# Patient Record
Sex: FEMALE | Race: WHITE | HISPANIC OR LATINO | Employment: UNEMPLOYED | URBAN - METROPOLITAN AREA
[De-identification: names, ages, dates, MRNs, and addresses within clinical notes are randomized per-mention and may not be internally consistent; named-entity substitution may affect disease eponyms.]

---

## 2017-01-20 ENCOUNTER — TELEPHONE (OUTPATIENT)
Dept: INTERNAL MEDICINE | Facility: CLINIC | Age: 27
End: 2017-01-20

## 2017-01-20 NOTE — TELEPHONE ENCOUNTER
Panel Management Review      Patient has the following on her problem list: None      Composite cancer screening  Chart review shows that this patient is due/due soon for the following Pap Smear  Summary:    Patient is due/failing the following:   PAP    Action needed:   No action require- Dr Combs saw pt once for an acute reason. Pt's PCP is Dr Velez.    Type of outreach:    NO action require.    Questions for provider review:    None                                                                                                                                     Urmila Germain CMA       Chart routed to CLOSED .

## 2017-11-12 ENCOUNTER — APPOINTMENT (OUTPATIENT)
Dept: GENERAL RADIOLOGY | Facility: CLINIC | Age: 27
End: 2017-11-12
Attending: EMERGENCY MEDICINE
Payer: COMMERCIAL

## 2017-11-12 ENCOUNTER — HOSPITAL ENCOUNTER (EMERGENCY)
Facility: CLINIC | Age: 27
Discharge: HOME | End: 2017-11-12
Attending: EMERGENCY MEDICINE | Admitting: EMERGENCY MEDICINE
Payer: COMMERCIAL

## 2017-11-12 VITALS
SYSTOLIC BLOOD PRESSURE: 113 MMHG | HEART RATE: 76 BPM | HEIGHT: 65 IN | OXYGEN SATURATION: 99 % | WEIGHT: 170 LBS | DIASTOLIC BLOOD PRESSURE: 76 MMHG | TEMPERATURE: 98.5 F | RESPIRATION RATE: 16 BRPM | BODY MASS INDEX: 28.32 KG/M2

## 2017-11-12 DIAGNOSIS — R07.9 ACUTE CHEST PAIN: ICD-10-CM

## 2017-11-12 LAB
ANION GAP SERPL CALCULATED.3IONS-SCNC: 7 MMOL/L (ref 3–14)
BASOPHILS # BLD AUTO: 0 10E9/L (ref 0–0.2)
BASOPHILS NFR BLD AUTO: 0.4 %
BUN SERPL-MCNC: 17 MG/DL (ref 7–30)
CALCIUM SERPL-MCNC: 7.8 MG/DL (ref 8.5–10.1)
CHLORIDE SERPL-SCNC: 107 MMOL/L (ref 94–109)
CO2 SERPL-SCNC: 26 MMOL/L (ref 20–32)
CREAT SERPL-MCNC: 0.65 MG/DL (ref 0.52–1.04)
DIFFERENTIAL METHOD BLD: NORMAL
EOSINOPHIL # BLD AUTO: 0.1 10E9/L (ref 0–0.7)
EOSINOPHIL NFR BLD AUTO: 2 %
ERYTHROCYTE [DISTWIDTH] IN BLOOD BY AUTOMATED COUNT: 14.5 % (ref 10–15)
GFR SERPL CREATININE-BSD FRML MDRD: >90 ML/MIN/1.7M2
GLUCOSE SERPL-MCNC: 91 MG/DL (ref 70–99)
HCT VFR BLD AUTO: 37.2 % (ref 35–47)
HGB BLD-MCNC: 12.3 G/DL (ref 11.7–15.7)
IMM GRANULOCYTES # BLD: 0 10E9/L (ref 0–0.4)
IMM GRANULOCYTES NFR BLD: 0.2 %
LYMPHOCYTES # BLD AUTO: 1.6 10E9/L (ref 0.8–5.3)
LYMPHOCYTES NFR BLD AUTO: 28.7 %
MCH RBC QN AUTO: 29.9 PG (ref 26.5–33)
MCHC RBC AUTO-ENTMCNC: 33.1 G/DL (ref 31.5–36.5)
MCV RBC AUTO: 91 FL (ref 78–100)
MONOCYTES # BLD AUTO: 0.4 10E9/L (ref 0–1.3)
MONOCYTES NFR BLD AUTO: 7 %
NEUTROPHILS # BLD AUTO: 3.4 10E9/L (ref 1.6–8.3)
NEUTROPHILS NFR BLD AUTO: 61.7 %
NRBC # BLD AUTO: 0 10*3/UL
NRBC BLD AUTO-RTO: 0 /100
PLATELET # BLD AUTO: 242 10E9/L (ref 150–450)
POTASSIUM SERPL-SCNC: 3.8 MMOL/L (ref 3.4–5.3)
RBC # BLD AUTO: 4.11 10E12/L (ref 3.8–5.2)
SODIUM SERPL-SCNC: 140 MMOL/L (ref 133–144)
TROPONIN I SERPL-MCNC: <0.015 UG/L (ref 0–0.04)
WBC # BLD AUTO: 5.6 10E9/L (ref 4–11)

## 2017-11-12 PROCEDURE — 25000132 ZZH RX MED GY IP 250 OP 250 PS 637: Performed by: EMERGENCY MEDICINE

## 2017-11-12 PROCEDURE — 85025 COMPLETE CBC W/AUTO DIFF WBC: CPT | Performed by: EMERGENCY MEDICINE

## 2017-11-12 PROCEDURE — 93005 ELECTROCARDIOGRAM TRACING: CPT

## 2017-11-12 PROCEDURE — 84484 ASSAY OF TROPONIN QUANT: CPT | Performed by: EMERGENCY MEDICINE

## 2017-11-12 PROCEDURE — 94640 AIRWAY INHALATION TREATMENT: CPT

## 2017-11-12 PROCEDURE — 99285 EMERGENCY DEPT VISIT HI MDM: CPT | Mod: 25

## 2017-11-12 PROCEDURE — 80048 BASIC METABOLIC PNL TOTAL CA: CPT | Performed by: EMERGENCY MEDICINE

## 2017-11-12 PROCEDURE — 71020 XR CHEST 2 VW: CPT

## 2017-11-12 PROCEDURE — 25000125 ZZHC RX 250: Performed by: EMERGENCY MEDICINE

## 2017-11-12 RX ORDER — IPRATROPIUM BROMIDE AND ALBUTEROL SULFATE 2.5; .5 MG/3ML; MG/3ML
SOLUTION RESPIRATORY (INHALATION)
Status: DISCONTINUED
Start: 2017-11-12 | End: 2017-11-13 | Stop reason: HOSPADM

## 2017-11-12 RX ORDER — IPRATROPIUM BROMIDE AND ALBUTEROL SULFATE 2.5; .5 MG/3ML; MG/3ML
3 SOLUTION RESPIRATORY (INHALATION) ONCE
Status: COMPLETED | OUTPATIENT
Start: 2017-11-12 | End: 2017-11-12

## 2017-11-12 RX ORDER — ACETAMINOPHEN 325 MG/1
975 TABLET ORAL ONCE
Status: COMPLETED | OUTPATIENT
Start: 2017-11-12 | End: 2017-11-12

## 2017-11-12 RX ADMIN — ACETAMINOPHEN 975 MG: 325 TABLET, FILM COATED ORAL at 22:01

## 2017-11-12 RX ADMIN — IPRATROPIUM BROMIDE AND ALBUTEROL SULFATE 3 ML: .5; 3 SOLUTION RESPIRATORY (INHALATION) at 21:39

## 2017-11-12 ASSESSMENT — ENCOUNTER SYMPTOMS
COUGH: 1
FEVER: 0
CHEST TIGHTNESS: 1
HEADACHES: 1

## 2017-11-12 NOTE — ED AVS SNAPSHOT
[facility] Emergency Department    [address]    [facility] [address]    Phone:  [phone]    Fax:  [phone]                                       Stacey Phillips   MRN: [id number]    Department:  [facility] Emergency Department   Date of Visit:  11/12/2017           After Visit Summary Signature Page     I have received my discharge instructions, and my questions have been answered. I have discussed any challenges I see with this plan with the nurse or doctor.    ..........................................................................................................................................  Patient/Patient Representative Signature      ..........................................................................................................................................  Patient Representative Print Name and Relationship to Patient    ..................................................               ................................................  Date                                            Time    ..........................................................................................................................................  Reviewed by Signature/Title    ...................................................              ..............................................  Date                                                            Time

## 2017-11-12 NOTE — ED AVS SNAPSHOT
[facility] Emergency Department    [address]    [facility] [address]    Phone:  [phone]    Fax:  [phone]                                       Stacey Phillips   MRN: [id number]    Department:  [facility] Emergency Department   Date of Visit:  11/12/2017           Patient Information     Date Of Birth          1990        Your diagnoses for this visit were:     Acute chest pain        You were seen by Nithya Wilkinson MD.      Follow-up Information     Follow up with Clinic, [facility] In 3 days.    Specialty:  Internal Medicine    Contact information:    [address]  [place] [address]  [phone]          Discharge Instructions       Discharge Instructions  Chest Pain    You have been seen today for chest pain or discomfort.  At this time, your doctor has found no signs that your chest pain is due to a serious or life-threatening condition, (or you have declined more testing and/or admission to the hospital). However, sometimes there is a serious problem that does not show up right away. Your evaluation today may not be complete and you may need further testing and evaluation.     You need to follow-up with your regular doctor within 3 days.    Return to the Emergency Department if:    Your chest pain changes, gets worse, starts to happen more often, or comes with less activity.    You are short of breath.    You get very weak or tired.    You pass out or faint.    You have any new symptoms, like fever, cough, numb legs, or you cough up blood.    You have anything else that worries you.    Until you follow-up with your regular doctor please do the following:    Take one aspirin daily unless you have an allergy or are told not to by your doctor.    If a stress test appointment has been made, go to the appointment.    If you have questions, contact your regular doctor.    If your doctor today has told you to follow-up with  your regular doctor, it is very important that you make an appointment with your clinic and go to the appointment.  If you do not follow-up with your primary doctor, it may result in missing an important development which could result in permanent injury or disability and/or lasting pain.  If there is any problem keeping your appointment, call your doctor or return to the Emergency Department.    If you were given a prescription for medicine here today, be sure to read all of the information (including the package insert) that comes with your prescription.  This will include important information about the medicine, its side effects, and any warnings that you need to know about.  The pharmacist who fills the prescription can provide more information and answer questions you may have about the medicine.  If you have questions or concerns that the pharmacist cannot address, please call or return to the Emergency Department.           24 Hour Appointment Hotline       To make an appointment at any [facility], call [phone] ([phone]). If you don't have a family doctor or clinic, we will help you find one. [facility] are conveniently located to serve the needs of you and your family.             Review of your medicines      Our records show that you are taking the medicines listed below. If these are incorrect, please call your family doctor or clinic.        Dose / Directions Last dose taken    ibuprofen 600 MG tablet   Commonly known as:  ADVIL/MOTRIN   Dose:  600 mg   Quantity:  30 tablet        Take 1 tablet (600 mg) by mouth every 6 hours as needed for moderate pain   Refills:  1        TYLENOL PO   Dose:  1000 mg        Take 1,000 mg by mouth   Refills:  0                Procedures and tests performed during your visit     Basic metabolic panel    CBC with platelets differential    Chest XR,  PA & LAT    EKG 12 lead    Troponin I      Orders Needing Specimen Collection     None      Pending  Results     Date and Time Order Name Status Description    11/12/2017 2017 EKG 12 lead Preliminary             Pending Culture Results     No orders found from 11/10/2017 to 11/13/2017.            Pending Results Instructions     If you had any lab results that were not finalized at the time of your Discharge, you can call the ED Lab Result RN at [phone]. You will be contacted by this team for any positive Lab results or changes in treatment. The nurses are available 7 days a week from 10A to 6:30P.  You can leave a message 24 hours per day and they will return your call.        Test Results From Your Hospital Stay        11/12/2017  8:46 PM      Narrative     CHEST TWO VIEWS   11/12/2017 8:43 PM    HISTORY:  Cough.    COMPARISON:  None.    FINDINGS:  The heart size is normal. No mediastinal pathology is seen.  The lungs are clear. The pulmonary vasculature is normal. No  pneumothorax or pleural effusion is seen.         Impression     IMPRESSION:  Unremarkable chest.    LYNN LUZ MD         11/12/2017  9:49 PM      Component Results     Component Value Ref Range & Units Status    WBC 5.6 4.0 - 11.0 10e9/L Final    RBC Count 4.11 3.8 - 5.2 10e12/L Final    Hemoglobin 12.3 11.7 - 15.7 g/dL Final    Hematocrit 37.2 35.0 - 47.0 % Final    MCV 91 78 - 100 fl Final    MCH 29.9 26.5 - 33.0 pg Final    MCHC 33.1 31.5 - 36.5 g/dL Final    RDW 14.5 10.0 - 15.0 % Final    Platelet Count 242 150 - 450 10e9/L Final    Diff Method Automated Method  Final    % Neutrophils 61.7 % Final    % Lymphocytes 28.7 % Final    % Monocytes 7.0 % Final    % Eosinophils 2.0 % Final    % Basophils 0.4 % Final    % Immature Granulocytes 0.2 % Final    Nucleated RBCs 0 0 /100 Final    Absolute Neutrophil 3.4 1.6 - 8.3 10e9/L Final    Absolute Lymphocytes 1.6 0.8 - 5.3 10e9/L Final    Absolute Monocytes 0.4 0.0 - 1.3 10e9/L Final    Absolute Eosinophils 0.1 0.0 - 0.7 10e9/L Final    Absolute Basophils 0.0 0.0 - 0.2 10e9/L Final     Abs Immature Granulocytes 0.0 0 - 0.4 10e9/L Final    Absolute Nucleated RBC 0.0  Final         11/12/2017 10:12 PM      Component Results     Component Value Ref Range & Units Status    Sodium 140 133 - 144 mmol/L Final    Potassium 3.8 3.4 - 5.3 mmol/L Final    Chloride 107 94 - 109 mmol/L Final    Carbon Dioxide 26 20 - 32 mmol/L Final    Anion Gap 7 3 - 14 mmol/L Final    Glucose 91 70 - 99 mg/dL Final    Urea Nitrogen 17 7 - 30 mg/dL Final    Creatinine 0.65 0.52 - 1.04 mg/dL Final    GFR Estimate >90 >60 mL/min/1.7m2 Final    Non  GFR Calc    GFR Estimate If Black >90 >60 mL/min/1.7m2 Final    African American GFR Calc    Calcium 7.8 (L) 8.5 - 10.1 mg/dL Final         11/12/2017 10:12 PM      Component Results     Component Value Ref Range & Units Status    Troponin I ES <0.015 0.000 - 0.045 ug/L Final    The 99th percentile for upper reference range is 0.045 ug/L.  Troponin values   in the range of 0.045 - 0.120 ug/L may be associated with risks of adverse   clinical events.                  Clinical Quality Measure: Blood Pressure Screening     Your blood pressure was checked while you were in the emergency department today. The last reading we obtained was  BP: 117/81 . Please read the guidelines below about what these numbers mean and what you should do about them.  If your systolic blood pressure (the top number) is less than 120 and your diastolic blood pressure (the bottom number) is less than 80, then your blood pressure is normal. There is nothing more that you need to do about it.  If your systolic blood pressure (the top number) is 120-139 or your diastolic blood pressure (the bottom number) is 80-89, your blood pressure may be higher than it should be. You should have your blood pressure rechecked within a year by a primary care provider.  If your systolic blood pressure (the top number) is 140 or greater or your diastolic blood pressure (the bottom number) is 90 or greater, you may  "have high blood pressure. High blood pressure is treatable, but if left untreated over time it can put you at risk for heart attack, stroke, or kidney failure. You should have your blood pressure rechecked by a primary care provider within the next 4 weeks.  If your provider in the emergency department today gave you specific instructions to follow-up with your doctor or provider even sooner than that, you should follow that instruction and not wait for up to 4 weeks for your follow-up visit.        Thank you for choosing [facility]       Thank you for choosing [facility] for your care. Our goal is always to provide you with excellent care. Hearing back from our patients is one way we can continue to improve our services. Please take a few minutes to complete the written survey that you may receive in the mail after you visit with us. Thank you!        [facility] Information     [employer] lets you send messages to your doctor, view your test results, renew your prescriptions, schedule appointments and more. To sign up, go to [email or web address] . Click on \"Log in\" on the left side of the screen, which will take you to the Welcome page. Then click on \"Sign up Now\" on the right side of the page.     You will be asked to enter the access code listed below, as well as some personal information. Please follow the directions to create your username and password.     Your access code is: [email or web address]  Expires: 2/10/2018 10:28 PM     Your access code will  in 90 days. If you need help or a new code, please call your [facility] clinic or [phone].        Care EveryWhere ID     This is your Care EveryWhere ID. This could be used by other organizations to access your [facility] medical records  [id number]        Equal Access to Services     ZEINAB MASON : nilam Moreno qaybta kaalmada adeegyada, waxay idiin hayaan adeeg kharash la'aan ah. So [facility] [phone].    ATENCIÓN: Si dmitry jaime, " tiene a sierra disposición servicios gratuitos de asistencia lingüística. Lllogan al [phone].    We comply with applicable federal civil rights laws and [place] laws. We do not discriminate on the basis of race, color, national origin, age, disability, sex, sexual orientation, or gender identity.            After Visit Summary       This is your record. Keep this with you and show to your community pharmacist(s) and doctor(s) at your next visit.

## 2017-11-13 LAB — INTERPRETATION ECG - MUSE: NORMAL

## 2017-11-13 NOTE — ED NOTES
Pt arrives with cough and chest pain. Ongoing past 24 hrs, pain worse with coughing, A/ox 3, ABC's intact. Took tylenol this am 0800. No med hx or home meds.

## 2017-11-13 NOTE — ED PROVIDER NOTES
History     Chief Complaint:  Chest Pain and Cough    HPI   Stacey Phillips is a 27 year old female with a history of anemia who presents with chest pain and cough. The patient states she has had some chest pressure for the past week or so and then last night she developed some congestion and a cough. Today, her symptoms have worsened, so she came in for evaluation since the chest tightness has been bothering her. The patient notes she has not been coughing up much phlegm at all. She does state she has been having more headaches than normal, which are temporarily alleviated with Tylenol, but return shortly after it wears off. She denies any history of blood clots, recent travel, leg swelling or hormone use. Of note, multiple of the patient's family members have had cardiovascular events including her father's first heart attack in his early 30s.    CARDIAC RISK FACTORS:  Sex:    Female  Tobacco:   No  Hypertension:   No  Hyperlipidemia:  No  Diabetes:   No  Family History:  Yes, father    PE/DVT RISK FACTORS:  Sex:    Female  Hormones:   No  Tobacco:   No  Cancer:   No  Travel:   No  Surgery:   No  Other immobilization: No  Personal history:  No  Family history:  No    Allergies:  Augmentin: hives     Medications:    Tylenol  Ibuprofen    Past Medical History:    Anemia    Past Surgical History:    History reviewed. No pertinent surgical history.    Family History:    Heart disease  Stomach cancer  Eczema    Social History:  Smoking status: No  Alcohol use: Yes, occasionally  PCP: Ghazal Murcia  Marital Status: Single [1]     Review of Systems   Constitutional: Negative for fever.   HENT: Positive for congestion.    Respiratory: Positive for cough and chest tightness.    Cardiovascular: Negative for leg swelling.   Neurological: Positive for headaches.   All other systems reviewed and are negative.    Physical Exam     Patient Vitals for the past 24 hrs:   BP Temp Temp src Pulse Resp SpO2  "Height Weight   11/12/17 2131 117/81 - - - - 100 % - -   11/12/17 2013 140/83 98.5  F (36.9  C) Oral 76 16 98 % 1.651 m (5' 5\") 77.1 kg (170 lb)     Physical Exam  General: Patient is alert and interactive when I enter the room  Head:  The scalp, face, and head appear normal  Eyes:  Conjunctivae are normal  ENT:    The nose is normal    Pinnae are normal    External acoustic canals are normal  Neck:  Trachea midline  CV:  Pulses are normal, RRR  Resp:  No respiratory distress, CTAB  Abdomen:      Soft, non-tender, non-distended  Musc:  Normal muscular tone    No major joint effusions    No asymmetric leg swelling    Good capillary refill noted  Skin:  No rash or lesions noted  Neuro:  Speech is normal and fluent. Face is symmetric.     Moving all extremities well.   Psych: Awake. Alert.  Normal affect.  Appropriate interactions.    Emergency Department Course   ECG (20:18:58):  Rate 75 bpm. ME interval 152. QRS duration 92. QT/QTc 394/439. P-R-T axes 54 64 51. Normal sinus rhythm. Normal ECG. Interpreted at 2022 by Nithya Wilkinson MD.    Imaging:  Radiographic findings were communicated with the patient who voiced understanding of the findings.    Chest XR, PA & LAT:  Unremarkable chest.  As read by Radiology.    Laboratory:  CBC: WNL (WBC 5.6, HGB 12.3, )   BMP: Calcium 7.8 (L), o/w WNL (Creatinine 0.65)  Troponin: <0.015    Interventions:  2139: Duoneb, 3 mL, nebulization   2201: 975 mg Tylenol PO    Emergency Department Course:  Past medical records, nursing notes, and vitals reviewed.  2105: I performed an exam of the patient and obtained history, as documented above.  ECG performed, results above.  IV inserted and blood drawn.    2225: I rechecked the patient. Explained findings to the patient.    I rechecked the patient. Findings and plan explained to the patient. Patient discharged home with instructions regarding supportive care, medications, and reasons to return. The importance of close follow-up " was reviewed.     Impression & Plan      Medical Decision Making:  Stacey Phillips is a 27 year old female was evaluated in the ED for a cough and chest pressure. The patient was afebrile in the ED. The patient's workup did not reveal a bacterial source for infection. I have encouraged symptomatic management with analgesics,. The patient's exam was unremarkable.  She felt better with Tylenol and a Duoneb here in the ED. She does have a family history of cardiac disease so an EKG and labs were obtained. EKG unremarkable. CBC, troponin and BMP normal. CXR normal. No need for D-dimer as patient is PERC negative. Her chest pain as been going over for more then 6 hours so I think a one time troponin is sufficient to rule her out for ACS. I suspect this is either a MSK source of her chest pain versus bronchitis.  At this time the patient is stable for discharge and should follow up with their primary care physician in the outpatient setting. Anticipatory guidance given prior to discharge.    Diagnosis:    ICD-10-CM   1. Acute chest pain R07.9     Disposition: Discharged to home    Shonda Ferreira  11/12/2017   [facility] EMERGENCY DEPARTMENT    IShonda, am serving as a scribe at 9:05 PM on 11/12/2017 to document services personally performed by Nithya Wilkinson MD based on my observations and the provider's statements to me.        Nithya Wilkinson MD  11/13/17 0001

## 2020-02-23 ENCOUNTER — HEALTH MAINTENANCE LETTER (OUTPATIENT)
Age: 30
End: 2020-02-23

## 2020-03-10 ENCOUNTER — HOSPITAL ENCOUNTER (EMERGENCY)
Facility: CLINIC | Age: 30
Discharge: HOME | End: 2020-03-10
Attending: EMERGENCY MEDICINE | Admitting: EMERGENCY MEDICINE
Payer: MEDICAID

## 2020-03-10 ENCOUNTER — RECORDS - HEALTHEAST (OUTPATIENT)
Dept: ADMINISTRATIVE | Facility: OTHER | Age: 30
End: 2020-03-10

## 2020-03-10 VITALS
DIASTOLIC BLOOD PRESSURE: 100 MMHG | HEART RATE: 74 BPM | RESPIRATION RATE: 18 BRPM | SYSTOLIC BLOOD PRESSURE: 142 MMHG | OXYGEN SATURATION: 100 % | TEMPERATURE: 97.4 F

## 2020-03-10 DIAGNOSIS — S06.0X0A CONCUSSION WITHOUT LOSS OF CONSCIOUSNESS, INITIAL ENCOUNTER: Primary | ICD-10-CM

## 2020-03-10 PROCEDURE — 99283 EMERGENCY DEPT VISIT LOW MDM: CPT

## 2020-03-10 RX ORDER — ONDANSETRON 4 MG/1
4 TABLET, ORALLY DISINTEGRATING ORAL EVERY 6 HOURS PRN
Qty: 12 TABLET | Refills: 0 | Status: SHIPPED | OUTPATIENT
Start: 2020-03-10 | End: 2020-03-14

## 2020-03-10 ASSESSMENT — ENCOUNTER SYMPTOMS
NECK PAIN: 0
CONFUSION: 0
VOMITING: 1

## 2020-03-10 NOTE — ED PROVIDER NOTES
"  History     Chief Complaint:  Fall    The history is provided by the patient.      Stacey Phillips is a healthy 29 year old female who presents for evaluation after a fall. She was in [facility] until 4 days ago. While there, she sustained a mechanical fall 2/2/7/20. She hit the back of her head. She did not lose consciousness, but did have blurry vision. Afterwards, she had a bump in this area which she iced and it has improved. She went to a doctor in a pharmacy in [facility] who gave her \"nausea and migraine medication.\" She has since ran out of this medication. She presents due to ongoing intermittent lightheadedness as well as intermittent nausea and vomiting (about 2 times a day). She also has occasional headaches, often at night, that start occipitally then radiate towards her forehead. Overall, her symptoms have improved since onset. She has no current headache, lightheadedness, or nausea. She denies vision changes, confusion, neck pain, or other concerns. She denies respiratory symptoms.    Allergies:  Augmentin     Medications:    The patient is currently on no regular medications.    Past Medical History:    Anemia  Menorrhagia  Allergic rhinitis  Asthma     Past Surgical History:    The patient does not have any pertinent past surgical history.    Family History:    Coronary artery disease - father  Stomach cancer - sister  Diabetes - mother  Paris's disease - mother  Thyroid disease - mother  Eczema - son     Social History:  Negative for tobacco use.  Negative for alcohol use - occasionally.  Negative for drug use.  Marital Status:  Single    Presents alone.    Review of Systems   Constitutional: Negative for fever.   HENT: Negative for rhinorrhea.    Eyes: Negative for visual disturbance (resolved).   Respiratory: Negative for cough.    Gastrointestinal: Positive for nausea (intermittent) and vomiting.   Musculoskeletal: Negative for neck pain.   Neurological: Positive for light-headedness " (intermittent) and headaches (intermittent). Negative for weakness.   Psychiatric/Behavioral: Negative for confusion.   All other systems reviewed and are negative.    Physical Exam     Patient Vitals for the past 24 hrs:   BP Temp Temp src Pulse Resp SpO2   03/10/20 1704 (!) 142/100 97.4  F (36.3  C) Temporal 74 18 100 %      Physical Exam  General: Well-developed and well-nourished. Well appearing young  woman. Cooperative.  Head:  Atraumatic. No palpable hematoma or step offs.   Eyes:  Conjunctivae, lids, and sclerae are normal.  ENT:    Normal nose. Moist mucous membranes. No Bernal's sign or raccoon's eyes. No hemotympanum.  Neck:  Supple. Normal range of motion. No midline tenderness.  CV:  Regular rate and rhythm. Normal heart sounds with no murmurs, rubs, or gallops detected.  Resp:  No respiratory distress. Clear to auscultation bilaterally without decreased breath sounds, wheezing, rales, or rhonchi.  GI:  Non-distended.     MS:  Normal ROM. No external signs of trauma.   Skin:  Warm. Non-diaphoretic. No pallor.  Neuro: Awake. A&Ox3.     Strength 5/5 bilateral upper and lower extremities.    Sensation intact to light touch.    No facial droop. No dysarthria.    No aphasia.    PERRL.   Psych: Normal mood and affect. Normal speech.  Vitals reviewed.    Emergency Department Course   Emergency Department Course:  Nursing notes and vitals reviewed.  1810: I performed an exam of the patient as documented above.   1818: Findings and plan explained to the patient. Patient discharged home with instructions regarding supportive care, medications, and reasons to return. The importance of close follow-up was reviewed. The patient was prescribed Zofran.    I personally answered all related questions prior to discharge.     Impression & Plan    Medical Decision Making:  Stacey is a healthy 29-year-old female who had a fall and head injury 12 days ago.  She did not have loss of consciousness, but did have some  blurred vision afterwards.  Since then she has had intermittent headaches that are overall improving as well is intermittent lightheadedness, nausea, and occasional vomiting.  During interview she does not have any significant symptoms but is presenting because she wonders if there is anything she can take for her symptoms and how long they will last.  Fortunately, her exam is quite reassuring.  She has no external evidence of trauma including no palpable hematoma or evidence of basilar skull fracture.  She has no focal neurologic deficits.  Certainly her symptoms are most consistent with a concussion.  Without any current symptoms in the ER, severe symptoms, loss of consciousness, confusion, or neurologic deficits, CT imaging of the brain can safely be deferred.  I discussed at length with Stacey the treatment for her concussion.  We discussed no return to activities until she is completely asymptomatic in order to avoid reinjury.  She will use Tylenol or ibuprofen when she has her headaches and I will provide her with Zofran as needed for nausea and vomiting.  I also put in a concussion referral and provided her with information for the concussion clinic so she can follow-up and have treatment through them as her symptoms have been present for now 12 days.  Stacey is comfortable with plan for discharge and understands why CT imaging is being deferred.  However, she will return if she has significant worsening of pain, syncope, confusion, weakness, or any other new or concerning symptoms as she may require further investigation is she is worsening.  All of her questions were answered and she verbalized understanding.  Amenable to discharge with concussion clinic follow-up.    Diagnosis:    ICD-10-CM    1. Concussion without loss of consciousness, initial encounter  S06.0X0A CONCUSSION  REFERRAL       Disposition:  discharged home    Discharge Medications:  New Prescriptions    ONDANSETRON (ZOFRAN  ODT) 4 MG ODT TAB    Take 1 tablet (4 mg) by mouth every 6 hours as needed for nausea     Scribe Disclosure:  I, Mirtha Palmer MD, am serving as a scribe at 6:25 PM on 3/10/2020 to document services personally performed by Shelbi No MD based on my observations and the provider's statements to me.    3/10/2020   [facility] EMERGENCY DEPARTMENT       Shelbi No MD  03/11/20 1516

## 2020-03-10 NOTE — ED AVS SNAPSHOT
[facility] Emergency Department  [address]  [facility] [address]  Phone:  [phone]  Fax:  [phone]                                    Stacey Phillips   MRN: [id number]    Department:  [facility] Emergency Department   Date of Visit:  3/10/2020           After Visit Summary Signature Page    I have received my discharge instructions, and my questions have been answered. I have discussed any challenges I see with this plan with the nurse or doctor.    ..........................................................................................................................................  Patient/Patient Representative Signature      ..........................................................................................................................................  Patient Representative Print Name and Relationship to Patient    ..................................................               ................................................  Date                                   Time    ..........................................................................................................................................  Reviewed by Signature/Title    ...................................................              ..............................................  Date                                               Time          22EPIC Rev 08/18
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
## 2020-03-11 ASSESSMENT — ENCOUNTER SYMPTOMS
FEVER: 0
WEAKNESS: 0
COUGH: 0
NAUSEA: 1
HEADACHES: 1
RHINORRHEA: 0
LIGHT-HEADEDNESS: 1

## 2020-03-18 ENCOUNTER — AMBULATORY - HEALTHEAST (OUTPATIENT)
Dept: NEUROLOGY | Facility: CLINIC | Age: 30
End: 2020-03-18

## 2020-03-18 DIAGNOSIS — S06.0XAA CONCUSSION: ICD-10-CM

## 2020-04-02 ENCOUNTER — TELEPHONE (OUTPATIENT)
Dept: PEDIATRICS | Facility: CLINIC | Age: 30
End: 2020-04-02

## 2020-04-02 NOTE — TELEPHONE ENCOUNTER
Call received from patient stating she was seen in the ED 3/10/19 for concussion after fall on 2/27/20. She was referred to the concussion clinic but states she can't get an appointment because they are not taking any new patients. Patient states she still has headaches and lightheadedness daily. States the light headedness occurs with position changes or any sudden movements. Patient is no longer having nausea. Patient calling asking to schedule an appointment. Patient does not have a primary care provider. Was seen by Dr. Combs 3 years ago. Will route to Dr. Combs for direction as writer does not know how to advise patient. Unsure if in clinic visit is appropriate or if virtual visit with either one of our providers or [facility] would be appropriate. Please advise.

## 2020-04-02 NOTE — TELEPHONE ENCOUNTER
She really needs to go see a neurologist rather than the internist, there is not a whole lot that we would be able to do.  I see she was seen at 1 of the [facility] clinics last year, not sure if her insurance changed again so it may be best for her to check with her insurance regarding neurologist she can see.  Otherwise [facility] has a concussion program she might want to check with insurance to see if they would cover her there.   normal...

## 2020-04-16 ENCOUNTER — HOSPITAL ENCOUNTER (OUTPATIENT)
Dept: NEUROLOGY | Facility: CLINIC | Age: 30
Setting detail: THERAPIES SERIES
Discharge: STILL ADMITTED OR ADMINISTRATIVE | End: 2020-04-16
Attending: NURSE PRACTITIONER

## 2020-04-24 ENCOUNTER — HOSPITAL ENCOUNTER (OUTPATIENT)
Dept: NEUROLOGY | Facility: CLINIC | Age: 30
Setting detail: THERAPIES SERIES
Discharge: STILL ADMITTED OR ADMINISTRATIVE | End: 2020-04-24
Attending: NURSE PRACTITIONER

## 2020-04-30 ENCOUNTER — HOSPITAL ENCOUNTER (OUTPATIENT)
Dept: NEUROLOGY | Facility: CLINIC | Age: 30
Setting detail: THERAPIES SERIES
Discharge: STILL ADMITTED OR ADMINISTRATIVE | End: 2020-04-30
Attending: NURSE PRACTITIONER

## 2020-05-28 ENCOUNTER — HOSPITAL ENCOUNTER (OUTPATIENT)
Dept: NEUROLOGY | Facility: CLINIC | Age: 30
Setting detail: THERAPIES SERIES
Discharge: STILL ADMITTED OR ADMINISTRATIVE | End: 2020-05-28
Attending: NURSE PRACTITIONER

## 2020-05-28 DIAGNOSIS — G47.00 INSOMNIA, UNSPECIFIED TYPE: ICD-10-CM

## 2020-05-28 DIAGNOSIS — R68.89 SENSITIVITY TO LIGHT: ICD-10-CM

## 2020-05-28 DIAGNOSIS — R42 DIZZINESS: ICD-10-CM

## 2020-05-28 DIAGNOSIS — R53.83 FATIGUE, UNSPECIFIED TYPE: ICD-10-CM

## 2020-05-28 DIAGNOSIS — G44.309 POST-CONCUSSION HEADACHE: ICD-10-CM

## 2020-05-28 DIAGNOSIS — R41.3 MEMORY DIFFICULTIES: ICD-10-CM

## 2020-05-28 DIAGNOSIS — H83.3X3 SOUND SENSITIVITY IN BOTH EARS: ICD-10-CM

## 2020-05-28 DIAGNOSIS — F06.4 ANXIETY DISORDER DUE TO MEDICAL CONDITION: ICD-10-CM

## 2020-05-28 DIAGNOSIS — R11.0 NAUSEA: ICD-10-CM

## 2020-05-28 DIAGNOSIS — F07.81 POST CONCUSSION SYNDROME: ICD-10-CM

## 2020-06-12 ENCOUNTER — HOSPITAL ENCOUNTER (OUTPATIENT)
Dept: NEUROLOGY | Facility: CLINIC | Age: 30
Setting detail: THERAPIES SERIES
Discharge: STILL ADMITTED OR ADMINISTRATIVE | End: 2020-06-12
Attending: NURSE PRACTITIONER

## 2020-10-08 ENCOUNTER — VIRTUAL VISIT (OUTPATIENT)
Dept: FAMILY MEDICINE | Facility: OTHER | Age: 30
End: 2020-10-08
Payer: COMMERCIAL

## 2020-10-08 PROCEDURE — 99421 OL DIG E/M SVC 5-10 MIN: CPT | Performed by: NURSE PRACTITIONER

## 2020-10-08 NOTE — PROGRESS NOTES
"Date: 10/08/2020 11:18:33  Clinician: Paula Whelan  Clinician NPI: [id number]  Patient: Stacey Phillips  Patient : 1990  Patient Address: [address] [place] [address]  Patient Phone: [phone]  Visit Protocol: URI  Patient Summary:  Stacey is a 30 year old ( : 1990 ) female who initiated a [facility] Visit for COVID-19 (Coronavirus) evaluation and screening. When asked the question \"Please sign me up to receive news, health information and promotions from [facility].\", Stacey responded \"No\".    Stacey states her symptoms started suddenly 3-4 days ago.   Her symptoms consist of ear pain, a headache, a cough, rhinitis, myalgia, a sore throat, and diarrhea.   Symptom details     Nasal secretions: The color of her mucus is clear.    Cough: Stacey coughs a few times an hour and her cough is not more bothersome at night. Phlegm comes into her throat when she coughs. She does not believe her cough is caused by post-nasal drip. The color of the phlegm is clear.     Sore throat: Stacey reports having mild throat pain (1-3 on a 10 point pain scale), does not have exudate on her tonsils, and can swallow liquids. The lymph nodes in her neck are not enlarged. A rash has not appeared on the skin since the sore throat started.     Headache: She states the headache is mild (1-3 on a 10 point pain scale).      Stacey denies having wheezing, fever, enlarged lymph nodes, nasal congestion, anosmia, vomiting, nausea, facial pain or pressure, chills, malaise, teeth pain, and ageusia. She also denies double sickening (worsening symptoms after initial improvement), taking antibiotic medication in the past month, and having recent facial or sinus surgery in the past 60 days. She is not experiencing dyspnea.   Precipitating events  Within the past week, Stacey has not been exposed to someone with strep throat. She has not recently been exposed to someone with influenza. Stacey has been " in close contact with the following high risk individuals: children under the age of 5.   Pertinent COVID-19 (Coronavirus) information  In the past 14 days, Stacey has not worked in a congregate living setting.   She does not work or volunteer as healthcare worker or a  and does not work or volunteer in a healthcare facility.   Stacey also has not lived in a congregate living setting in the past 14 days. She does not live with a healthcare worker.   Stacey has had a close contact with a laboratory-confirmed COVID-19 patient within 14 days of symptom onset. Additional information about contact with COVID-19 (Coronavirus) patient as reported by the patient (free text): A friend came to visit 09/26&amp;09/27   Since December 2019, Stacey and has not had upper respiratory infection or influenza-like illness. Has not been diagnosed with lab-confirmed COVID-19 test   Pertinent medical history  Stacey does not get yeast infections when she takes antibiotics.   Stacey does not need a return to work/school note.   Weight: 190 lbs   Stacey does not smoke or use smokeless tobacco.   She denies pregnancy and denies breastfeeding. She has menstruated in the past month.   Weight: 190 lbs    MEDICATIONS: No current medications, ALLERGIES: NKDA  Clinician Response:  Dear Stacey,         Your symptoms show that you may have coronavirus (COVID-19). This&nbsp;illness can cause fever, cough and trouble breathing. Many people get a mild case and get better on their own. Some people can get very sick.  What should I do?  We would like to test you for this virus.  1. Please call [phone] to schedule your visit. Explain that you were referred by [facility] to have a COVID-19 test. Be ready to share your [facility] visit ID number. Do not schedule your appointment until you have had at least 2 days of symptoms or you may receive a false negative result.  The following will serve as your written order for  "this COVID Test, ordered by me, for the indication of suspected COVID [Z20.828]: The test will be ordered in Epic, our electronic health record, after you are scheduled. It will show as ordered and authorized by Arpit Collins MD.  Order: COVID-19 (Coronavirus) PCR for SYMPTOMATIC testing from [facility].    2. When it's time for your COVID test:  Stay at least 6 feet away from others. (If someone will drive you to your test, stay in the backseat, as far away from the  as you can.)  Cover your mouth and nose with a mask, tissue or washcloth.  Go straight to the testing site. Don't make any stops on the way there or back.    3.Starting now:&nbsp;Stay home and away from others (self-isolate) until:   You've had&nbsp;no&nbsp;fever---and no medicine that reduces fever---for one full day (24 hours).&nbsp;And...  Your other symptoms have gotten better. For example, your cough or breathing has improved.&nbsp;And...  At least&nbsp;10 days&nbsp;have passed since your symptoms started.    During this time, don't leave the house except for testing or medical care.   Stay in your own room, even for meals. Use your own bathroom if you can.  Stay away from others in your home. No hugging, kissing or shaking hands. No visitors.  Don't go to work, school or anywhere else.   Clean \"high touch\" surfaces often (doorknobs, counters, handles, etc.). Use a household cleaning spray or wipes. You'll find a full list of  on the EPA website:&nbsp;www.epa.gov/pesticide-registration/list-n-disinfectants-use-against-sars-cov-2.   Cover your mouth and nose with a mask, tissue or washcloth to avoid spreading germs.  Wash your hands and face often. Use soap and water.  Caregivers in these groups are at risk for severe illness due to COVID-19:  o People 65 years and older  o People who live in a nursing home or long-term care facility  o People with chronic disease (lung, heart, cancer, diabetes, kidney, liver, immunologic)  o People who " have a weakened immune system, including those who:   Are in cancer treatment  Take medicine that weakens the immune system, such as corticosteroids  Had a bone marrow or organ transplant  Have an immune deficiency  Have poorly controlled HIV or AIDS  Are obese (body mass index of 40 or higher)  Smoke regularly   o Caregivers should wear gloves while washing dishes, handling laundry and cleaning bedrooms and bathrooms.  o Use caution when washing and drying laundry: Don't shake dirty laundry, and use the warmest water setting that you can.  o For more tips, go to&nbsp;www.cdc.gov/coronavirus/2019-ncov/downloads/10Things.pdf.   How can I take care of myself?    Get lots of rest. Drink extra fluids&nbsp;(unless a doctor has told you not to).  Take Tylenol (acetaminophen) for fever or pain.&nbsp;If you have liver or kidney problems, ask your family doctor if it's okay to take Tylenol.   Adults can take either:   650 mg (two 325 mg pills) every 4 to 6 hours,&nbsp;or...  1,000 mg (two 500 mg pills) every 8 hours as needed.  Note:&nbsp;Don't take more than 3,000 mg in one day. Acetaminophen is found in many medicines (both prescribed and over-the-counter medicines). Read all labels to be sure you don't take too much.   For children, check the Tylenol bottle for the right dose. The dose is based on the child's age or weight.   If you have other health problems (like cancer, heart failure, an organ transplant or severe kidney disease):&nbsp;Call your specialty clinic if you don't feel better in the next 2 days.    Know when to call 911.&nbsp;Emergency warning signs include:   Trouble breathing or shortness of breath Pain or pressure in the chest that doesn't go away Feeling confused like you haven't felt before, or not being able to wake up Bluish-colored lips or face.  Where can I get more information?    [employer] [facility] -- About COVID-19:&nbsp;[email or web address]  CDC -- What to Do If You're  Sick:&nbsp;www.cdc.gov/coronavirus/2019-ncov/about/steps-when-sick.html  CDC -- Ending Home Isolation:&nbsp;www.cdc.gov/coronavirus/2019-ncov/hcp/disposition-in-home-patients.html  [facility] -- Caring for Someone:&nbsp;www.cdc.gov/coronavirus/2019-ncov/if-you-are-sick/care-for-someone.html  [facility] -- Interim Guidance for Hospital Discharge to Home:&nbsp;www.[facility].[place]./diseases/coronavirus/hcp/hospdischarge.pdf  [facility] clinical trials (COVID-19 research studies):&nbsp;clinicalaffairs.[facility]-clinical-trials  Below are the COVID-19 hotlines at the [place] Department of Health ([facility]). Interpreters are available.   For health questions: Call 681-910-9708 or 1-776.290.7238 (7 a.m. to 7 p.m.) For questions about schools and childcare: Call 387-933-5523 or 1-527.859.6480 (7 a.m. to 7 p.m.)           Diagnosis: Other malaise  Diagnosis ICD: R53.81

## 2020-10-13 DIAGNOSIS — Z20.822 SUSPECTED 2019 NOVEL CORONAVIRUS INFECTION: Primary | ICD-10-CM

## 2020-12-06 ENCOUNTER — HEALTH MAINTENANCE LETTER (OUTPATIENT)
Age: 30
End: 2020-12-06

## 2021-04-10 ENCOUNTER — HEALTH MAINTENANCE LETTER (OUTPATIENT)
Age: 31
End: 2021-04-10

## 2021-06-07 NOTE — PROGRESS NOTES
Called pt 2 X. Left VM stating pt will need to call scheduling.  This is the 3rd consult she has been scheduled for and has not answered the phone for her appointment.
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
## 2021-07-03 NOTE — ADDENDUM NOTE
Addendum Note by Aminta Shoemaker at 5/28/2020 10:30 AM     Author: Aminta Shoemaker Service: -- Author Type: --    Filed: 7/28/2020  6:27 AM Date of Service: 5/28/2020 10:30 AM Status: Signed    : Aminta Shoemaker    Encounter addended by: Aminta Shoemaker on: 7/28/2020  6:27 AM      Actions taken: Charge Capture section accepted

## 2021-09-25 ENCOUNTER — HEALTH MAINTENANCE LETTER (OUTPATIENT)
Age: 31
End: 2021-09-25

## 2022-05-07 ENCOUNTER — HEALTH MAINTENANCE LETTER (OUTPATIENT)
Age: 32
End: 2022-05-07

## 2022-12-26 ENCOUNTER — HEALTH MAINTENANCE LETTER (OUTPATIENT)
Age: 32
End: 2022-12-26

## 2023-06-02 ENCOUNTER — HEALTH MAINTENANCE LETTER (OUTPATIENT)
Age: 33
End: 2023-06-02